# Patient Record
Sex: FEMALE | Race: WHITE | Employment: OTHER | URBAN - METROPOLITAN AREA
[De-identification: names, ages, dates, MRNs, and addresses within clinical notes are randomized per-mention and may not be internally consistent; named-entity substitution may affect disease eponyms.]

---

## 2024-08-09 ENCOUNTER — HOSPITAL ENCOUNTER (OUTPATIENT)
Age: 24
Discharge: HOME | End: 2024-08-09
Payer: OTHER GOVERNMENT

## 2024-08-09 ENCOUNTER — APPOINTMENT (OUTPATIENT)
Dept: GENERAL RADIOLOGY | Age: 24
End: 2024-08-09
Attending: Physician Assistant
Payer: OTHER GOVERNMENT

## 2024-08-09 VITALS
OXYGEN SATURATION: 100 % | DIASTOLIC BLOOD PRESSURE: 87 MMHG | RESPIRATION RATE: 20 BRPM | HEART RATE: 82 BPM | TEMPERATURE: 98 F | SYSTOLIC BLOOD PRESSURE: 147 MMHG

## 2024-08-09 DIAGNOSIS — S80.01XA CONTUSION OF RIGHT KNEE, INITIAL ENCOUNTER: ICD-10-CM

## 2024-08-09 DIAGNOSIS — S83.91XA SPRAIN OF RIGHT KNEE, UNSPECIFIED LIGAMENT, INITIAL ENCOUNTER: Primary | ICD-10-CM

## 2024-08-09 PROCEDURE — 73560 X-RAY EXAM OF KNEE 1 OR 2: CPT | Performed by: PHYSICIAN ASSISTANT

## 2024-08-09 PROCEDURE — A6449 LT COMPRES BAND >=3" <5"/YD: HCPCS | Performed by: PHYSICIAN ASSISTANT

## 2024-08-09 PROCEDURE — 99203 OFFICE O/P NEW LOW 30 MIN: CPT | Performed by: PHYSICIAN ASSISTANT

## 2024-08-09 NOTE — ED PROVIDER NOTES
Chief Complaint   Patient presents with    Knee Pain       History obtained from: patient   services not used    HPI:     Nathalia Carolina is a 24 year old female who presents with right knee pain following mechanical fall 4 days ago.  Patient states she slipped on beads on the floor and landed directly on her right knee.  Patient notes bruising to the front of the knee.  Patient denies any other injury sustained or complaints at this time.  Denies head injury, LOC, neck pain, back pain, wound, numbness, weakness.    PMH  No past medical history on file.    PFSH    PFSH asessment screens reviewed and agree.  Nurses notes reviewed I agree with documentation.    No family history on file.  Family history reviewed with patient/caregiver and is not pertinent to presenting problem.  Social History     Socioeconomic History    Marital status:      Spouse name: Not on file    Number of children: Not on file    Years of education: Not on file    Highest education level: Not on file   Occupational History    Not on file   Tobacco Use    Smoking status: Not on file    Smokeless tobacco: Not on file   Substance and Sexual Activity    Alcohol use: Not on file    Drug use: Not on file    Sexual activity: Not on file   Other Topics Concern    Not on file   Social History Narrative    Not on file     Social Determinants of Health     Financial Resource Strain: Not on file   Food Insecurity: Not on file   Transportation Needs: Not on file   Physical Activity: Not on file   Stress: Not on file   Social Connections: Not on file   Housing Stability: Not on file         ROS:   Positive for stated complaint: right knee pain   All other systems reviewed and negative except as noted above.  Constitutional and Vital Signs Reviewed.    Physical Exam:     Findings:    /87   Pulse 82   Temp 98.4 °F (36.9 °C) (Temporal)   Resp 20   SpO2 100%   GENERAL: well developed, no acute distress, non-toxic appearing    SKIN: good skin turgor, no obvious rashes  HEAD: normocephalic, atraumatic  EYES: sclera non-icteric bilaterally, conjunctiva clear bilaterally  OROPHARYNX: MMM, pharynx clear, maintaining airway and secretions  NECK: no nuchal rigidity, no trismus, no edema, phonation normal    CARDIO: Regular rate, DP pulse 2+ bilaterally, cap refill less than 2 seconds  LUNGS: no increased WOB  EXTREMITIES: Tenderness and faint ecchymosis to anterior right knee, no obvious swelling or deformity, minimal tenderness to medial right knee.  FROM, compartments soft, CMS intact, skin intact  NEURO: no focal deficits  PSYCH: alert and oriented x3, answering questions appropriately, mood appropriate    MDM/Assessment/Plan:   Orders for this encounter:    Orders Placed This Encounter    XR KNEE (1 OR 2 VIEWS), RIGHT (CPT=73560)     Order Specific Question:   What is the Relevant Clinical Indication / Reason for Exam?     Answer:   Right Leg Problem     Order Specific Question:   Release to patient     Answer:   Immediate    Ace wrap     To affected area       Labs performed this visit:  No results found for this or any previous visit (from the past 10 hour(s)).    Imaging performed this visit:  XR KNEE (1 OR 2 VIEWS), RIGHT (CPT=73560)   Final Result   PROCEDURE: XR KNEE (1 OR 2 VIEWS), RIGHT (CPT=73560)       COMPARISON: None.       INDICATIONS: Post fall x 4 days. Right knee pain       TECHNIQUE: 2 views were obtained.         FINDINGS:    BONES: Normal. No significant arthropathy, fracture or acute abnormality.   SOFT TISSUES: Negative. No visible soft tissue swelling.    EFFUSION: None visible.    OTHER: Negative.                    =====   CONCLUSION: Normal examination.                 Dictated by (CST): Chirag Patterson MD on 8/09/2024 at 1:23 PM        Finalized by (CST): Chirag Patterson MD on 8/09/2024 at 1:30 PM                   Medical Decision Making  DDx includes sprain versus contusion versus fracture versus other.  Patient  is overall very well-appearing with stable vitals.  No signs of neurovascular compromise or compartment syndrome.  No other injuries sustained or complaints apart from right knee pain.  X-ray of right knee reviewed, no evidence of acute osseous or soft tissue abnormality.  Discussed results with patient which patient was reassured by.  Ace wrap applied to right knee.  Discussed supportive care including rest, ice, elevation, compression, and OTC Tylenol/Motrin as needed for pain.  Instructed patient to go directly to nearest ER with any worsening or concerning symptoms.  Follow-up with Ortho.    Amount and/or Complexity of Data Reviewed  Radiology: ordered and independent interpretation performed.    Risk  OTC drugs.          Diagnosis:    ICD-10-CM    1. Sprain of right knee, unspecified ligament, initial encounter  S83.91XA       2. Contusion of right knee, initial encounter  S80.01XA           All results reviewed and discussed with patient/patient's family. Patient/patient's family verbalize excellent understanding of instructions and feels comfortable with plan. All of patient's/patient's family's questions were addressed.   See AVS for detailed discharge instructions for your condition today.    Follow Up with:  Cristian Ferrari MD  [address],  [place] [address] [phone]      Ortho      Note: This document was dictated using Dragon medical dictation software.  Proofreading was performed to the best of my ability, but errors may be present.    Liya Rae PA-C

## 2024-08-09 NOTE — DISCHARGE INSTRUCTIONS
Rest, ice, and elevate knee   Alternate Motrin/Tylenol as needed for pain   Drink plenty of fluids   Get plenty of rest   Avoid excessive twisting, bending, or lifting   Follow up with your primary care provider and/or ortho